# Patient Record
Sex: FEMALE | URBAN - METROPOLITAN AREA
[De-identification: names, ages, dates, MRNs, and addresses within clinical notes are randomized per-mention and may not be internally consistent; named-entity substitution may affect disease eponyms.]

---

## 2022-09-26 ENCOUNTER — APPOINTMENT (RX ONLY)
Dept: URBAN - METROPOLITAN AREA CLINIC 124 | Facility: CLINIC | Age: 72
Setting detail: DERMATOLOGY
End: 2022-09-26

## 2022-09-26 DIAGNOSIS — L80 VITILIGO: ICD-10-CM

## 2022-09-26 DIAGNOSIS — L64.8 OTHER ANDROGENIC ALOPECIA: ICD-10-CM

## 2022-09-26 DIAGNOSIS — L98.8 OTHER SPECIFIED DISORDERS OF THE SKIN AND SUBCUTANEOUS TISSUE: ICD-10-CM

## 2022-09-26 PROCEDURE — 99204 OFFICE O/P NEW MOD 45 MIN: CPT

## 2022-09-26 PROCEDURE — ? PRESCRIPTION MEDICATION MANAGEMENT

## 2022-09-26 PROCEDURE — ? COUNSELING

## 2022-09-26 ASSESSMENT — LOCATION DETAILED DESCRIPTION DERM
LOCATION DETAILED: RIGHT FOREHEAD
LOCATION DETAILED: LEFT INFERIOR VERMILION LIP
LOCATION DETAILED: RIGHT SUPERIOR VERMILION BORDER

## 2022-09-26 ASSESSMENT — LOCATION SIMPLE DESCRIPTION DERM
LOCATION SIMPLE: RIGHT FOREHEAD
LOCATION SIMPLE: RIGHT UPPER LIP
LOCATION SIMPLE: LEFT LIP

## 2022-09-26 ASSESSMENT — LOCATION ZONE DERM
LOCATION ZONE: FACE
LOCATION ZONE: LIP

## 2022-09-26 NOTE — PROCEDURE: MIPS QUALITY
Quality 226: Preventive Care And Screening: Tobacco Use: Screening And Cessation Intervention: Patient screened for tobacco use and is an ex/non-smoker
Quality 111:Pneumonia Vaccination Status For Older Adults: Pneumococcal vaccine (PPSV23) administered on or after patient’s 60th birthday and before the end of the measurement period
Quality 130: Documentation Of Current Medications In The Medical Record: Current Medications Documented
Detail Level: Detailed
Quality 431: Preventive Care And Screening: Unhealthy Alcohol Use - Screening: Patient not identified as an unhealthy alcohol user when screened for unhealthy alcohol use using a systematic screening method
Quality 110: Preventive Care And Screening: Influenza Immunization: Influenza Immunization Administered during Influenza season
Quality 47: Advance Care Plan: Advance Care Planning discussed and documented in the medical record; patient did not wish or was not able to name a surrogate decision maker or provide an advance care plan.

## 2024-08-02 ENCOUNTER — APPOINTMENT (RX ONLY)
Dept: URBAN - METROPOLITAN AREA CLINIC 124 | Facility: CLINIC | Age: 74
Setting detail: DERMATOLOGY
End: 2024-08-02

## 2024-08-02 DIAGNOSIS — L60.3 NAIL DYSTROPHY: ICD-10-CM

## 2024-08-02 PROCEDURE — ? COUNSELING

## 2024-08-02 PROCEDURE — 99212 OFFICE O/P EST SF 10 MIN: CPT

## 2024-08-02 ASSESSMENT — LOCATION SIMPLE DESCRIPTION DERM: LOCATION SIMPLE: RIGHT THUMBNAIL

## 2024-08-02 ASSESSMENT — LOCATION ZONE DERM: LOCATION ZONE: FINGERNAIL

## 2024-08-02 ASSESSMENT — LOCATION DETAILED DESCRIPTION DERM: LOCATION DETAILED: RIGHT THUMBNAIL
